# Patient Record
Sex: FEMALE | Race: WHITE
[De-identification: names, ages, dates, MRNs, and addresses within clinical notes are randomized per-mention and may not be internally consistent; named-entity substitution may affect disease eponyms.]

---

## 2019-04-19 ENCOUNTER — HOSPITAL ENCOUNTER (OUTPATIENT)
Dept: HOSPITAL 95 - ORSCSDS | Age: 59
Discharge: HOME | End: 2019-04-19
Attending: PODIATRIST
Payer: COMMERCIAL

## 2019-04-19 VITALS — BODY MASS INDEX: 22.51 KG/M2 | HEIGHT: 60.98 IN | WEIGHT: 119.2 LBS

## 2019-04-19 DIAGNOSIS — M20.11: Primary | ICD-10-CM

## 2019-04-19 DIAGNOSIS — Z79.899: ICD-10-CM

## 2019-04-19 PROCEDURE — C1713 ANCHOR/SCREW BN/BN,TIS/BN: HCPCS

## 2019-04-19 PROCEDURE — C1769 GUIDE WIRE: HCPCS

## 2019-04-19 PROCEDURE — 0QSQ04Z REPOSITION RIGHT TOE PHALANX WITH INTERNAL FIXATION DEVICE, OPEN APPROACH: ICD-10-PCS | Performed by: PODIATRIST

## 2019-04-19 PROCEDURE — 0QSN04Z REPOSITION RIGHT METATARSAL WITH INTERNAL FIXATION DEVICE, OPEN APPROACH: ICD-10-PCS | Performed by: PODIATRIST

## 2019-04-19 NOTE — NUR
04/19/19 1117 Darcy Moreira
PT STILL SLEEPY ON ARRIVAL TO SDU HOWEVER RESPONDS TO VERBAL COMMANDS.
PT DENIES PAIN AND NAUSEA AT THIS TIME. TOLERATING PO FLUIDS WELL.
 WILL RETURN AFTER APPT. DRESSING CDI AND ICE AND ELEVATION
IMPLEMENTED.